# Patient Record
Sex: FEMALE | Race: WHITE | NOT HISPANIC OR LATINO | Employment: OTHER
[De-identification: names, ages, dates, MRNs, and addresses within clinical notes are randomized per-mention and may not be internally consistent; named-entity substitution may affect disease eponyms.]

---

## 2024-05-17 ENCOUNTER — TRANSCRIBE ORDERS (OUTPATIENT)
Dept: OTHER | Age: 64
End: 2024-05-17

## 2024-05-17 DIAGNOSIS — H44.432 HYPOTONY OF EYE DUE TO OTHER OCULAR DISORDERS, LEFT EYE: Primary | ICD-10-CM

## 2024-05-19 ENCOUNTER — HEALTH MAINTENANCE LETTER (OUTPATIENT)
Age: 64
End: 2024-05-19

## 2024-05-21 NOTE — PROGRESS NOTES
Chief Complaint/Presenting Concern: post-surgical hypotony, left eye    History of Present Illness:   Sandra Guerrero is a 64 year old patient who presents for evaluation of    Underwent cataract surgery of right and left eye in Nov 2022. 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 She subsequently developed an ERM of the left eye and underwent membrane peel on 4/24/24 (Dr. Alonso, [facility]). Her recovery was uneventful until post-op week #3. 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 A BCL and pressure patch was placed and she was started on prednisolone q2h and ofloxacin. After 2 days, the IOP and choroidal folds improved. Dr. Alonso was concerned for cyclodialysis cleft as etiology of hypotony. He is unable to visualize cleft on gonioscopy, and referred patient to [facility].     Today, 5/23/24, patient states she has been slowly improving over the past few days. Her vision is getting more clear. She is compliant with drops.     Drops on initial evaluation:  prednisolone, q3hr, left eye  atropine, TID, left eye     Relevant Past Medical/Family/Social History:  Ocular Surgeries:  - IOL OU - 11/15/22 OD, OS 11/1/22   - PSTK OS 5/11/23, 2/1/24  - s/p PPV/MP/Afx, OS (4/24/24, [facility])     Relevant Review of Systems: none     Diagnosis: Post-surgical hypotony, left eye. S/p ERM peel on 4/24/24  Year diagnosis: 5/14/24  Previous glaucoma surgery/laser:   none  Maximum intraocular pressure 20/18  Current meds: see below  Family history: negative  CCT: 572/583   Gonio: open to CBB in all 4 quadrants, in both eyes  Refractive status: high myopia (-12.0 & -13.5)  Trauma history: negative  Steroid exposure: rare use of prednisone for sinus infections. Possible steroid-induced IOP elevation in the past (per patient)  Marijuana use and frequency: no  Vasospastic disease:  Migrane/Raynaud phenomenon: Yes, migraines  A past hemodynamic crisis or Low BP:: negative  Meds AEs/intolerance: sulfa allergy-causes oropharyngeal edema  PMHx: Positive for: Sulfa allergy, kidney stones Negative for: Asthma and respiratory problems/Cardiac/Renal  Anticoagulants: none  Previous testing:    Today's testing:  Visual field May 21, 2024:   Right eye - full field, reliable;   Left eye - nonspecific field defects superiorly, reliable  OCT Optic Nerve RNFL Spectralis May 21, 2024  right eye: myopic changes, thinning superior, nasal, and inferiorly  left eye: myopic changes, thinning superiorly and inferiorly    Additional Ocular History:   #) s/p PPV/MP/Afx, left eye (4/24/24, [facility])  ERM    #) CME, pseudophakic, left eye   -STK OS #2 2/1/24    #) ERM, right eye     #) PCIOL, each eye     #) PVD, each eye     #) Peripheral Reticular Degeneration, each eye       Plan/Recommendations:  Discussed findings with patient. IOP 18 and 22. No obvious cleft on gonioscopy, or UBM. No choroidal effusions on fundus exam. Macula with evidence of hypotony maculopathy still persistent. Vision has improved to 20/50 pin-hole, while dilated. Possible she may have experienced a cleft that has sealed, or a trocar wound that leaked and is now sealed in the setting of very high myopia and thin sclera.  Continue drops  Prednisolone as per Dr. Alonso ( following up with him next week)  Stop Atropine left eye    RTC As needed     Thank you for entrusting us with your care  Benny Eugene MD, PGY3  Ophthalmology Resident  [facility]    Physician Attestation     Attending Physician Attestation:  Complete documentation of historical and exam elements from today's encounter can be found in the full encounter summary report (not reduplicated in this progress note). I personally obtained the chief complaint(s) and history of present illness. I confirmed and edited as necessary the review of systems, past medical/surgical  history, family history, social history, and examination findings as documented by others; and I examined the patient myself. I personally reviewed the relevant tests, images, and reports as documented above. I personally reviewed the ophthalmic test(s) associated with this encounter, agree with the interpretation(s) as documented by the resident/fellow and have edited the corresponding report(s) as necessary. I formulated and edited as necessary the assessment and plan and discussed the findings and management plan with the patient and any family members present at the time of the visit.  Angelina Lin M.D., Glaucoma, May 21, 2024

## 2024-05-22 DIAGNOSIS — H40.9 GLAUCOMA: ICD-10-CM

## 2024-05-22 DIAGNOSIS — H44.40 HYPOTONY OF EYE: Primary | ICD-10-CM

## 2024-05-23 ENCOUNTER — OFFICE VISIT (OUTPATIENT)
Dept: OPHTHALMOLOGY | Facility: CLINIC | Age: 64
End: 2024-05-23
Attending: OPHTHALMOLOGY
Payer: COMMERCIAL

## 2024-05-23 DIAGNOSIS — H52.13 MYOPIA OF BOTH EYES: ICD-10-CM

## 2024-05-23 DIAGNOSIS — H35.352 CME (CYSTOID MACULAR EDEMA), LEFT: ICD-10-CM

## 2024-05-23 DIAGNOSIS — H44.40 HYPOTONY OF LEFT EYE: Primary | ICD-10-CM

## 2024-05-23 PROCEDURE — G0463 HOSPITAL OUTPT CLINIC VISIT: HCPCS | Performed by: OPHTHALMOLOGY

## 2024-05-23 PROCEDURE — 92083 EXTENDED VISUAL FIELD XM: CPT | Performed by: OPHTHALMOLOGY

## 2024-05-23 PROCEDURE — 99204 OFFICE O/P NEW MOD 45 MIN: CPT | Mod: GC | Performed by: OPHTHALMOLOGY

## 2024-05-23 PROCEDURE — 99207 PR BUNDLED PROCEDURE IN GLOBAL PKG: CPT | Performed by: OPHTHALMOLOGY

## 2024-05-23 PROCEDURE — 92134 CPTRZ OPH DX IMG PST SGM RTA: CPT | Mod: 26 | Performed by: OPHTHALMOLOGY

## 2024-05-23 PROCEDURE — 92133 CPTRZD OPH DX IMG PST SGM ON: CPT | Performed by: OPHTHALMOLOGY

## 2024-05-23 PROCEDURE — 76513 OPH US DX ANT SGM US UNI/BI: CPT | Performed by: OPHTHALMOLOGY

## 2024-05-23 PROCEDURE — 92134 CPTRZ OPH DX IMG PST SGM RTA: CPT | Performed by: OPHTHALMOLOGY

## 2024-05-23 RX ORDER — ACETAMINOPHEN 325 MG/1
325-650 TABLET ORAL EVERY 6 HOURS PRN
COMMUNITY

## 2024-05-23 RX ORDER — ATROPINE SULFATE 10 MG/ML
1 SOLUTION/ DROPS OPHTHALMIC 3 TIMES DAILY
COMMUNITY

## 2024-05-23 RX ORDER — PREDNISOLONE ACETATE 10 MG/ML
1 SUSPENSION/ DROPS OPHTHALMIC
COMMUNITY
Start: 2024-03-12

## 2024-05-23 RX ORDER — OMEGA-3 FATTY ACIDS/FISH OIL 300-1000MG
200 CAPSULE ORAL EVERY 4 HOURS PRN
COMMUNITY

## 2024-05-23 RX ORDER — ESCITALOPRAM OXALATE 10 MG/1
2 TABLET ORAL DAILY
COMMUNITY
Start: 2024-03-25

## 2024-05-23 RX ORDER — LOSARTAN POTASSIUM AND HYDROCHLOROTHIAZIDE 12.5; 5 MG/1; MG/1
1 TABLET ORAL DAILY
COMMUNITY
Start: 2023-10-25 | End: 2024-10-24

## 2024-05-23 RX ORDER — SEMAGLUTIDE 1.34 MG/ML
0.25 INJECTION, SOLUTION SUBCUTANEOUS
COMMUNITY
Start: 2024-06-26

## 2024-05-23 ASSESSMENT — CONF VISUAL FIELD
OD_SUPERIOR_NASAL_RESTRICTION: 0
METHOD: COUNTING FINGERS
OD_INFERIOR_NASAL_RESTRICTION: 0
OD_INFERIOR_TEMPORAL_RESTRICTION: 0
OS_SUPERIOR_NASAL_RESTRICTION: 0
OS_INFERIOR_NASAL_RESTRICTION: 0
OS_NORMAL: 1
OD_NORMAL: 1
OS_SUPERIOR_TEMPORAL_RESTRICTION: 0
OS_INFERIOR_TEMPORAL_RESTRICTION: 0
OD_SUPERIOR_TEMPORAL_RESTRICTION: 0

## 2024-05-23 ASSESSMENT — SLIT LAMP EXAM - LIDS
COMMENTS: NORMAL
COMMENTS: NORMAL

## 2024-05-23 ASSESSMENT — CUP TO DISC RATIO
OD_RATIO: 0.3
OS_RATIO: 0.3

## 2024-05-23 ASSESSMENT — VISUAL ACUITY
OS_SC: 20/100
OD_SC: 20/30
OD_SC+: +2
OD_PH_SC: 20/25
OS_PH_SC+: -1
OS_SC+: +1
METHOD: SNELLEN - LINEAR
OS_PH_SC: 20/50
OD_PH_SC+: -1

## 2024-05-23 ASSESSMENT — TONOMETRY
OD_IOP_MMHG: 18
OS_IOP_MMHG: 18
OS_IOP_MMHG: 22
IOP_METHOD: APPLANATION
IOP_METHOD: APPLANATION
OD_IOP_MMHG: 20

## 2024-05-23 ASSESSMENT — PACHYMETRY
OS_CT(UM): 583
OD_CT(UM): 572

## 2024-05-23 NOTE — NURSING NOTE
Chief Complaints and History of Present Illnesses   Patient presents with    Glaucoma Evaluation     Chief Complaint(s) and History of Present Illness(es)       Glaucoma Evaluation              Laterality: both eyes    Associated symptoms: dryness.  Negative for eye pain, tearing, floaters, itching and burning    Pain scale: 0/10              Comments    Yanna is here new to clinic, and referred by Dr. Alonso to rule out, cyclodialysis cleft,hypotony, choroidals. She says says left eye is blurry and has been since left eye vitrectomy surgery. History of Cataract surgery both eyes in 2022    Reece Heck COT 1:15 PM May 23, 2024

## 2025-05-18 ENCOUNTER — HEALTH MAINTENANCE LETTER (OUTPATIENT)
Age: 65
End: 2025-05-18